# Patient Record
Sex: FEMALE | Race: WHITE | Employment: FULL TIME | URBAN - METROPOLITAN AREA
[De-identification: names, ages, dates, MRNs, and addresses within clinical notes are randomized per-mention and may not be internally consistent; named-entity substitution may affect disease eponyms.]

---

## 2018-10-16 ENCOUNTER — TRANSFERRED RECORDS (OUTPATIENT)
Dept: HEALTH INFORMATION MANAGEMENT | Facility: CLINIC | Age: 38
End: 2018-10-16

## 2018-12-05 NOTE — TELEPHONE ENCOUNTER
RECORDS STATUS - ALL OTHER DIAGNOSIS      RECORDS RECEIVED FROM: [facility] derm clinic   DATE RECEIVED: 12/05/2018   NOTES STATUS DETAILS   OFFICE NOTE from referring provider Requested**UPDATE fax received and sent to HIM for scanning Dr Easley [facility] in [place], [place]   OFFICE NOTE from medical oncologist     DISCHARGE SUMMARY from hospital     DISCHARGE REPORT from the ER     OPERATIVE REPORT     MEDICATION LIST     CLINICAL TRIAL TREATMENTS TO DATE     LABS     PATHOLOGY REPORTS     ANYTHING RELATED TO DIAGNOSIS     GENONOMIC TESTING     TYPE:     IMAGING (NEED IMAGES & REPORT)     CT SCANS     MRI     MAMMO     ULTRASOUND     PET     Spoke to the [facility] clinic and they will call me back when they fax the MR over to us.

## 2018-12-06 ENCOUNTER — TELEPHONE (OUTPATIENT)
Dept: ONCOLOGY | Facility: CLINIC | Age: 38
End: 2018-12-06

## 2018-12-06 ENCOUNTER — PRE VISIT (OUTPATIENT)
Dept: ONCOLOGY | Facility: CLINIC | Age: 38
End: 2018-12-06

## 2018-12-06 ENCOUNTER — ONCOLOGY VISIT (OUTPATIENT)
Dept: ONCOLOGY | Facility: CLINIC | Age: 38
End: 2018-12-06
Attending: INTERNAL MEDICINE
Payer: COMMERCIAL

## 2018-12-06 VITALS
RESPIRATION RATE: 16 BRPM | WEIGHT: 219.9 LBS | TEMPERATURE: 98 F | DIASTOLIC BLOOD PRESSURE: 89 MMHG | HEART RATE: 79 BPM | SYSTOLIC BLOOD PRESSURE: 124 MMHG | OXYGEN SATURATION: 100 %

## 2018-12-06 DIAGNOSIS — C43.9 MELANOMA OF SKIN (H): Primary | ICD-10-CM

## 2018-12-06 PROBLEM — C43.71: Status: ACTIVE | Noted: 2018-12-06

## 2018-12-06 PROCEDURE — G0463 HOSPITAL OUTPT CLINIC VISIT: HCPCS | Mod: ZF

## 2018-12-06 PROCEDURE — 99205 OFFICE O/P NEW HI 60 MIN: CPT | Mod: ZP | Performed by: INTERNAL MEDICINE

## 2018-12-06 RX ORDER — DULOXETIN HYDROCHLORIDE 30 MG/1
CAPSULE, DELAYED RELEASE ORAL
COMMUNITY
Start: 2018-12-05

## 2018-12-06 RX ORDER — METOPROLOL SUCCINATE 100 MG/1
100 TABLET, EXTENDED RELEASE ORAL DAILY
Refills: 3 | COMMUNITY
Start: 2018-09-19

## 2018-12-06 RX ORDER — DEXLANSOPRAZOLE 60 MG/1
60 CAPSULE, DELAYED RELEASE ORAL
COMMUNITY

## 2018-12-06 RX ORDER — LEVOTHYROXINE SODIUM 25 UG/1
TABLET ORAL
Refills: 3 | COMMUNITY
Start: 2018-10-03

## 2018-12-06 ASSESSMENT — PAIN SCALES - GENERAL: PAINLEVEL: NO PAIN (0)

## 2018-12-06 NOTE — NURSING NOTE
Oncology Rooming Note    December 6, 2018 8:36 AM   Malika Alvarado is a 38 year old female who presents for:    Chief Complaint   Patient presents with     Oncology Clinic Visit     new pt complete questionable melanoma      Initial Vitals: There were no vitals taken for this visit. There is no height or weight on file to calculate BMI. There is no height or weight on file to calculate BSA.  Data Unavailable Comment: Data Unavailable   No LMP recorded.  Allergies reviewed: Yes  Medications reviewed: Yes    Medications: Medication refills not needed today.  Pharmacy name entered into EPIC: Data Unavailable    Clinical concerns: none      8 minutes for nursing intake (face to face time)     Yen Apolinar, CMA

## 2018-12-06 NOTE — MR AVS SNAPSHOT
After Visit Summary   12/6/2018    Malika Alvarado    MRN: [id number]           Patient Information     Date Of Birth          1980        Visit Information        Provider Department      12/6/2018 8:45 AM Fritz Odom MD [facility]        Today's Diagnoses     Melanoma of skin (H)    -  1       Follow-ups after your visit        Additional Services     GENERAL SURG ADULT REFERRAL       Your provider has referred you to: [facility]: Surgical Oncology - [place] [phone]    [email or web address]    Please be aware that coverage of these services is subject to the terms and limitations of your health insurance plan.  Call member services at your health plan with any benefit or coverage questions.      Please bring the following with you to your appointment:    (1) Any X-Rays, CTs or MRIs which have been performed.  Contact the facility where they were done to arrange for  prior to your scheduled appointment.   (2) List of current medications   (3) This referral request   (4) Any documents/labs given to you for this referral                  Follow-up notes from your care team     Return in about 4 weeks (around 1/3/2019).      Your next 10 appointments already scheduled     Dec 07, 2018 10:15 AM CST   (Arrive by 10:00 AM)   New Patient Visit with Cameron Chris MD   [facility] [place] ([facility])    [address] [place] [address]  [address] [place] [id number] [phone]              Who to contact     If you have questions or need follow up information about today's clinic visit or your schedule please contact [facility] directly at [phone].  Normal or non-critical lab and imaging results will be communicated to you by MyChart, letter or phone within 4 business days after the clinic has received the results. If you do not hear from us within 7 days, please  "contact the clinic through [employer] or phone. If you have a critical or abnormal lab result, we will notify you by phone as soon as possible.  Submit refill requests through [employer] or call your pharmacy and they will forward the refill request to us. Please allow 3 business days for your refill to be completed.          Additional Information About Your Visit        [employer] Information     [employer] lets you send messages to your doctor, view your test results, renew your prescriptions, schedule appointments and more. To sign up, go to [email or web address] . Click on \"Log in\" on the left side of the screen, which will take you to the Welcome page. Then click on \"Sign up Now\" on the right side of the page.     You will be asked to enter the access code listed below, as well as some personal information. Please follow the directions to create your username and password.     Your access code is: [email or web address]  Expires: 2019 12:58 PM     Your access code will  in 90 days. If you need help or a new code, please call your [place] clinic or [phone].        Care EveryWhere ID     This is your Care EveryWhere ID. This could be used by other organizations to access your [facility] [place] medical records  [id number]        Your Vitals Were     Pulse Temperature Respirations Pulse Oximetry          79 98  F (36.7  C) (Oral) 16 100%         Blood Pressure from Last 3 Encounters:   18 124/89    Weight from Last 3 Encounters:   18 99.7 kg (219 lb 14.4 oz)              We Performed the Following     GENERAL SURG ADULT REFERRAL        Primary Care Provider Office Phone # Fax #    Mckenzie Palacio PA-C [phone]       [address] [address]        Equal Access to Services     ALEJANDRA CALHOUN : Shadia Mckeon, matthew moses, tang nayak, toro zafar. So [facility] [phone].    ATENCIÓN: Si guanakola español, tiene a mercedes " disposición servicios gratuitos de asistencia lingüística. Cata troy [phone].    We comply with applicable federal civil rights laws and [place] laws. We do not discriminate on the basis of race, color, national origin, age, disability, sex, sexual orientation, or gender identity.            Thank you!     Thank you for choosing [facility] CANCER [facility]  for your care. Our goal is always to provide you with excellent care. Hearing back from our patients is one way we can continue to improve our services. Please take a few minutes to complete the written survey that you may receive in the mail after your visit with us. Thank you!             Your Updated Medication List - Protect others around you: Learn how to safely use, store and throw away your medicines at www.disposemymeds.org.          This list is accurate as of 12/6/18 10:12 AM.  Always use your most recent med list.                   Brand Name Dispense Instructions for use Diagnosis    DEXILANT 60 MG Cpdr CR capsule   Generic drug:  dexlansoprazole      Take 60 mg by mouth        DULoxetine 30 MG capsule    CYMBALTA     TAKE 1 CAPSULE BY MOUTH ONCE DAILY        IRON PO      Take 1 tablet by mouth        levothyroxine 25 MCG tablet    SYNTHROID/LEVOTHROID     TAKE 1 TABLET (25 MCG TOTAL) BY MOUTH ONCE DAILY.        metoprolol succinate  MG 24 hr tablet    TOPROL-XL     Take 100 mg by mouth daily

## 2018-12-06 NOTE — TELEPHONE ENCOUNTER
ONCOLOGY INTAKE: Records Information      APPT INFORMATION: 12/7/18  Referring provider:  Dr. Jose Martin Rubi  Referring provider s clinic:  [facility]  Reason for visit/diagnosis:  Surgical Consult for Melanoma of Skin    Were the records received with the referral (via Rightfax)? In Epic    Has patient been seen for any external appt for this diagnosis (enter clinic/location)? Yes - [facility] - We should have all records from pt's appt with Dr. Rubi.

## 2018-12-06 NOTE — LETTER
"12/6/2018       RE: Malika Alvarado  [address]  [place] [address]     Dear Colleague,    Thank you for referring your patient, Malika Alvarado, to the [facility] CANCER [facility]INIC. Please see a copy of my visit note below.    [facility]  MEDICAL ONCOLOGY CONSULT  Dec 6, 2018    CHIEF COMPLAINT: Cutaneous melanoma    HISTORY OF PRESENT ILLNESS  Malika Alvarado is a 38 year old female with a new diagnosis of invasive cutaneous melanoma. She first noted a mole in the right inner portion of her thigh about 1 1/2 years ago. She brought it up to her primary care doctor because it started to look \"odd\". The lesion was excised at [facility] on 10/16/2018 and punch biopsy specimen ([id number]) was described as a cylindrical portion of skin 0.6 cm in diameter and excised to a depth of 0.3 cm. The pathology report does not include information on depth of invasion, ulceration, or other standard pathologic descriptors. However, sample is described as a \"deep-penetrating melanocytic tumor, extending to within 0.5 mm of the peripheral biopsy margin\".     The specimen was sent to [facility] for further pathologic review and FISH testing. Several minor abnormalities were noted, including possible increased copy number of RREB1, CCND1, and MYC and loss of CDKN2A, however, magnitude of the changes seen was small and felt to be equivocal for a melanoma diagnosis.    Patient today feels well and she denies any major complaints, other than anxiety regarding the diagnosis and the delay in receiving a concrete diagnosis to decide on next steps.    Patient is a current smoker. She has had several indoor tanning sessions, which she would estimate at >100. She tends to sunburn, and remembers at least 2 severe and painful sunburns in her youth. She does use sunscreen with prolonged sun exposures, but not typically for short intermittent exposures. She denies a family history of melanoma, but a paternal uncle did " have pancreatic cancer, and her paternal grandmother had some kind of skin cancer.      REVIEW OF SYSTEMS  A 12-point ROS negative except as in HPI    Current Outpatient Medications   Medication Sig Dispense Refill     dexlansoprazole (DEXILANT) 60 MG CPDR CR capsule Take 60 mg by mouth       DULoxetine (CYMBALTA) 30 MG capsule TAKE 1 CAPSULE BY MOUTH ONCE DAILY       IRON PO Take 1 tablet by mouth       levothyroxine (SYNTHROID/LEVOTHROID) 25 MCG tablet TAKE 1 TABLET (25 MCG TOTAL) BY MOUTH ONCE DAILY.  3     metoprolol succinate ER (TOPROL-XL) 100 MG 24 hr tablet Take 100 mg by mouth daily  3       No Known Allergies    PAST MEDICAL HISTORY  1. Hypothyroidism  2. Hypertension  3. Mild depression    PAST SURGICAL HISTORY  1. Tonsillectomy  2.     SOCIAL HISTORY  Patient is  and has one 6 year old son. She smoked 1/4 to 1/2 pack per day. She is a social drinker.  History   Smoking Status     Current Every Day Smoker   Smokeless Tobacco     Never Used    Social History    Substance and Sexual Activity      Alcohol use: Not on file     History   Drug Use Not on file       FAMILY HISTORY  Mother: Essential thrombocythemia  Father: No cancer  Paternal grandmother: skin cancer  Paternal grandfather: lung cancer  Paternal uncle: pancreatic cancer    PHYSICAL EXAMINATION  /89 (BP Location: Right arm, Patient Position: Sitting, Cuff Size: Adult Regular)   Pulse 79   Temp 98  F (36.7  C) (Oral)   Resp 16   Wt 99.7 kg (219 lb 14.4 oz)   SpO2 100%   Wt Readings from Last 2 Encounters:   18 100.6 kg (221 lb 12.8 oz)   18 99.7 kg (219 lb 14.4 oz)     Physical Exam   Constitutional: She appears well-developed and well-nourished.   HENT:   Head: Normocephalic.   Mouth/Throat: Oropharynx is clear and moist.   Eyes: Pupils are equal, round, and reactive to light. No scleral icterus.   Neck: Normal range of motion.   Cardiovascular: Regular rhythm.   Pulmonary/Chest: Effort normal.   Abdominal:  Soft.   Musculoskeletal: Normal range of motion.   Lymphadenopathy:     She has no cervical adenopathy.     She has no axillary adenopathy.        Right: No inguinal adenopathy present.        Left: No inguinal adenopathy present.   Neurological: She is alert.   Skin: Skin is warm and dry.   No residual lesion identified right inner thigh   Nursing note and vitals reviewed.      ASSESSMENT AND PLAN  #1 Cutaneous melanoma versus dysplastic nevus, excised to depth 3-mm  It was a pleasure to meet Mrs. Alvarado today. She is a very pleasant 38 year old woman with a recently excised lesion that has been called equivocal for melanoma. We will request the pathologic specimen be reviewed by our pathologists.    Patient has been distressed by the long wait to get to a definitive diagnosis for next steps in management. We reviewed that based on the description of the biopsy specimen excised to depth of 3 mm with tumor cells seen within 0.5 mm of peripheral margin, the melanoma is likely to be less than T3 lesion. I explained that we typically would offer a sentinel lymph node biopsy and wide local excision for any lesion > 0.8 mm, therefore, given the uncertainties with the pathologic diagnosis it would be reasonable to refer her to surgical oncology.    I will plan to see her back in about 1 month for further review of pathology and results of her sentinel lymph node biopsy.    Fritz Sidhu M.D.   of Medicine  Hematology, Oncology and Transplantation      Melanoma of skin (H)  - GENERAL SURG ADULT REFERRAL          Again, thank you for allowing me to participate in the care of your patient.      Sincerely,    Fritz Rubi MD

## 2018-12-07 ENCOUNTER — ONCOLOGY VISIT (OUTPATIENT)
Dept: ONCOLOGY | Facility: CLINIC | Age: 38
End: 2018-12-07
Attending: SURGERY
Payer: COMMERCIAL

## 2018-12-07 ENCOUNTER — PRE VISIT (OUTPATIENT)
Dept: ONCOLOGY | Facility: CLINIC | Age: 38
End: 2018-12-07

## 2018-12-07 VITALS
RESPIRATION RATE: 14 BRPM | HEART RATE: 80 BPM | HEIGHT: 66 IN | BODY MASS INDEX: 35.65 KG/M2 | OXYGEN SATURATION: 100 % | DIASTOLIC BLOOD PRESSURE: 99 MMHG | TEMPERATURE: 97 F | WEIGHT: 221.8 LBS | SYSTOLIC BLOOD PRESSURE: 142 MMHG

## 2018-12-07 DIAGNOSIS — C43.71 MELANOMA OF THIGH, RIGHT (H): ICD-10-CM

## 2018-12-07 PROCEDURE — G0463 HOSPITAL OUTPT CLINIC VISIT: HCPCS | Mod: ZF

## 2018-12-07 RX ORDER — CEFAZOLIN SODIUM 1 G/50ML
1 INJECTION, SOLUTION INTRAVENOUS SEE ADMIN INSTRUCTIONS
Status: CANCELLED | OUTPATIENT
Start: 2018-12-07

## 2018-12-07 RX ORDER — CEFAZOLIN SODIUM 2 G/50ML
2 SOLUTION INTRAVENOUS
Status: CANCELLED | OUTPATIENT
Start: 2018-12-07

## 2018-12-07 ASSESSMENT — PAIN SCALES - GENERAL: PAINLEVEL: NO PAIN (0)

## 2018-12-07 NOTE — NURSING NOTE
"Oncology Rooming Note    December 7, 2018 10:02 AM   Malika Alvarado is a 38 year old female who presents for:    Chief Complaint   Patient presents with     Oncology Clinic Visit     [facility] NEW- MELANOMA     Initial Vitals: BP (!) 142/99 (BP Location: Right arm, Patient Position: Chair, Cuff Size: Adult Large)  Pulse 80  Temp 97  F (36.1  C) (Oral)  Resp 14  Ht 1.68 m (5' 6.14\")  Wt 100.6 kg (221 lb 12.8 oz)  SpO2 100%  BMI 35.65 kg/m2 Estimated body mass index is 35.65 kg/(m^2) as calculated from the following:    Height as of this encounter: 1.68 m (5' 6.14\").    Weight as of this encounter: 100.6 kg (221 lb 12.8 oz). Body surface area is 2.17 meters squared.  No Pain (0) Comment: Data Unavailable   No LMP recorded.  Allergies reviewed: Yes  Medications reviewed: Yes    Medications: Medication refills not needed today.  Pharmacy name entered into EPIC: Data Unavailable    Clinical concerns: New patient. Dot was notified.    10 minutes for nursing intake (face to face time)     Apolinar Mcgowan LPN            "

## 2018-12-07 NOTE — PROGRESS NOTES
HISTORY OF PRESENT ILLNESS:  Malika Alvarado is a 38-year-old woman I was asked to see at the request of Dr. Jose Martin Rubi for evaluation of a potential melanoma of the right inner thigh.  She noticed a lesion in her right inner thigh about a year or year and a half ago.  She saw her primary care physician who performed a 5 mm punch biopsy.  This was on 10/16.  The tissue has been reviewed at [facility] in [place] and at [facility].  The [facility] pathology report is equivocal.  There is a concern for melanoma but there is no information on the potential thickness or other prognostic factors for melanoma.  She has been really in limbo for multiple weeks about how this should be managed.  She saw Dr. Rubi yesterday who has requested her slides from [facility] to be reviewed here by our dermatopathologist.  She is now here to talk about treatment options.  She denies any symptoms of lymphadenopathy.  She denies any symptoms of metastatic disease.  She is otherwise healthy with no major medical problems except for mild hypertension.      FAMILY HISTORY:  Negative for melanoma.      PHYSICAL EXAMINATION:  She has a scar in her right inner thigh.  I do not see any residual pigmentation.  She has no lymphadenopathy.      ASSESSMENT AND PLAN:  Atypical skin lesion, right thigh.        PLAN:  She is obviously frustrated that she has no diagnosis and no plan of course of treatment for this lesion, so I have recommended that we assume that this is a melanoma and administer appropriate melanoma treatment which would be a wide local excision with a sentinel lymph node biopsy.  We will schedule this out for a couple of weeks to give us time for our pathologists to review this lesion.  If our pathologists think that this is not a melanoma or if our treatment is clearly overtreatment, then we can cut back on her surgery or potentially cancel it.  If our pathologists are equivocal as were the  [facility] pathologists, then I think we would proceed with a wide local excision and sentinel lymph node biopsy.  The patient and her  are in agreement with this plan.  We will tentatively schedule this for a couple of weeks.      TT:  30 minutes.  TT:  20 minutes.      cc:   Fritz Rubi MD   [facility]   [address] [place] [address]  [id number]

## 2018-12-11 NOTE — TELEPHONE ENCOUNTER
Found slides at [facility] and they will Fed Ex slides.  Path report to be faxed.

## 2018-12-12 NOTE — PROGRESS NOTES
"[facility]  MEDICAL ONCOLOGY CONSULT  Dec 6, 2018    CHIEF COMPLAINT: Cutaneous melanoma    HISTORY OF PRESENT ILLNESS  Malika Alvarado is a 38 year old female with a new diagnosis of invasive cutaneous melanoma. She first noted a mole in the right inner portion of her thigh about 1 1/2 years ago. She brought it up to her primary care doctor because it started to look \"odd\". The lesion was excised at [facility] on 10/16/2018 and punch biopsy specimen ([id number]) was described as a cylindrical portion of skin 0.6 cm in diameter and excised to a depth of 0.3 cm. The pathology report does not include information on depth of invasion, ulceration, or other standard pathologic descriptors. However, sample is described as a \"deep-penetrating melanocytic tumor, extending to within 0.5 mm of the peripheral biopsy margin\".     The specimen was sent to [facility] for further pathologic review and FISH testing. Several minor abnormalities were noted, including possible increased copy number of RREB1, CCND1, and MYC and loss of CDKN2A, however, magnitude of the changes seen was small and felt to be equivocal for a melanoma diagnosis.    Patient today feels well and she denies any major complaints, other than anxiety regarding the diagnosis and the delay in receiving a concrete diagnosis to decide on next steps.    Patient is a current smoker. She has had several indoor tanning sessions, which she would estimate at >100. She tends to sunburn, and remembers at least 2 severe and painful sunburns in her youth. She does use sunscreen with prolonged sun exposures, but not typically for short intermittent exposures. She denies a family history of melanoma, but a paternal uncle did have pancreatic cancer, and her paternal grandmother had some kind of skin cancer.      REVIEW OF SYSTEMS  A 12-point ROS negative except as in HPI    Current Outpatient Medications   Medication Sig Dispense Refill     " dexlansoprazole (DEXILANT) 60 MG CPDR CR capsule Take 60 mg by mouth       DULoxetine (CYMBALTA) 30 MG capsule TAKE 1 CAPSULE BY MOUTH ONCE DAILY       IRON PO Take 1 tablet by mouth       levothyroxine (SYNTHROID/LEVOTHROID) 25 MCG tablet TAKE 1 TABLET (25 MCG TOTAL) BY MOUTH ONCE DAILY.  3     metoprolol succinate ER (TOPROL-XL) 100 MG 24 hr tablet Take 100 mg by mouth daily  3       No Known Allergies    PAST MEDICAL HISTORY  1. Hypothyroidism  2. Hypertension  3. Mild depression    PAST SURGICAL HISTORY  1. Tonsillectomy  2.     SOCIAL HISTORY  Patient is  and has one 6 year old son. She smoked 1/4 to 1/2 pack per day. She is a social drinker.  History   Smoking Status     Current Every Day Smoker   Smokeless Tobacco     Never Used    Social History    Substance and Sexual Activity      Alcohol use: Not on file     History   Drug Use Not on file       FAMILY HISTORY  Mother: Essential thrombocythemia  Father: No cancer  Paternal grandmother: skin cancer  Paternal grandfather: lung cancer  Paternal uncle: pancreatic cancer    PHYSICAL EXAMINATION  /89 (BP Location: Right arm, Patient Position: Sitting, Cuff Size: Adult Regular)   Pulse 79   Temp 98  F (36.7  C) (Oral)   Resp 16   Wt 99.7 kg (219 lb 14.4 oz)   SpO2 100%   Wt Readings from Last 2 Encounters:   18 100.6 kg (221 lb 12.8 oz)   18 99.7 kg (219 lb 14.4 oz)     Physical Exam   Constitutional: She appears well-developed and well-nourished.   HENT:   Head: Normocephalic.   Mouth/Throat: Oropharynx is clear and moist.   Eyes: Pupils are equal, round, and reactive to light. No scleral icterus.   Neck: Normal range of motion.   Cardiovascular: Regular rhythm.   Pulmonary/Chest: Effort normal.   Abdominal: Soft.   Musculoskeletal: Normal range of motion.   Lymphadenopathy:     She has no cervical adenopathy.     She has no axillary adenopathy.        Right: No inguinal adenopathy present.        Left: No inguinal  adenopathy present.   Neurological: She is alert.   Skin: Skin is warm and dry.   No residual lesion identified right inner thigh   Nursing note and vitals reviewed.      ASSESSMENT AND PLAN  #1 Cutaneous melanoma versus dysplastic nevus, excised to depth 3-mm  It was a pleasure to meet Mrs. Alvarado today. She is a very pleasant 38 year old woman with a recently excised lesion that has been called equivocal for melanoma. We will request the pathologic specimen be reviewed by our pathologists.    Patient has been distressed by the long wait to get to a definitive diagnosis for next steps in management. We reviewed that based on the description of the biopsy specimen excised to depth of 3 mm with tumor cells seen within 0.5 mm of peripheral margin, the melanoma is likely to be less than T3 lesion. I explained that we typically would offer a sentinel lymph node biopsy and wide local excision for any lesion > 0.8 mm, therefore, given the uncertainties with the pathologic diagnosis it would be reasonable to refer her to surgical oncology.    I will plan to see her back in about 1 month for further review of pathology and results of her sentinel lymph node biopsy.    Fritz Sidhu M.D.   of Medicine  Hematology, Oncology and Transplantation      Melanoma of skin (H)  - GENERAL SURG ADULT REFERRAL

## 2018-12-13 PROCEDURE — 00000346 ZZHCL STATISTIC REVIEW OUTSIDE SLIDES TC 88321: Performed by: SURGERY

## 2018-12-24 ENCOUNTER — ANESTHESIA EVENT (OUTPATIENT)
Dept: SURGERY | Facility: AMBULATORY SURGERY CENTER | Age: 38
End: 2018-12-24

## 2018-12-24 DIAGNOSIS — C43.71 MELANOMA OF THIGH, RIGHT (H): Primary | ICD-10-CM

## 2018-12-24 RX ORDER — CEFAZOLIN SODIUM 2 G/50ML
2 SOLUTION INTRAVENOUS
Status: CANCELLED | OUTPATIENT
Start: 2018-12-24

## 2018-12-24 RX ORDER — CEFAZOLIN SODIUM 1 G/50ML
1 INJECTION, SOLUTION INTRAVENOUS SEE ADMIN INSTRUCTIONS
Status: CANCELLED | OUTPATIENT
Start: 2018-12-24

## 2018-12-26 ENCOUNTER — HOSPITAL ENCOUNTER (OUTPATIENT)
Facility: AMBULATORY SURGERY CENTER | Age: 38
End: 2018-12-26
Attending: SURGERY
Payer: COMMERCIAL

## 2018-12-26 ENCOUNTER — ANESTHESIA (OUTPATIENT)
Dept: SURGERY | Facility: AMBULATORY SURGERY CENTER | Age: 38
End: 2018-12-26

## 2018-12-26 VITALS
OXYGEN SATURATION: 100 % | BODY MASS INDEX: 35.52 KG/M2 | RESPIRATION RATE: 16 BRPM | SYSTOLIC BLOOD PRESSURE: 134 MMHG | DIASTOLIC BLOOD PRESSURE: 93 MMHG | TEMPERATURE: 98.2 F | WEIGHT: 221 LBS | HEIGHT: 66 IN

## 2018-12-26 LAB
HCG UR QL: NEGATIVE
INTERNAL QC OK POCT: YES

## 2018-12-26 RX ORDER — FENTANYL CITRATE 50 UG/ML
25-50 INJECTION, SOLUTION INTRAMUSCULAR; INTRAVENOUS
Status: DISCONTINUED | OUTPATIENT
Start: 2018-12-26 | End: 2018-12-27 | Stop reason: HOSPADM

## 2018-12-26 RX ORDER — CEFAZOLIN SODIUM 2 G/50ML
2 SOLUTION INTRAVENOUS
Status: DISCONTINUED | OUTPATIENT
Start: 2018-12-26 | End: 2018-12-27 | Stop reason: HOSPADM

## 2018-12-26 RX ORDER — NALOXONE HYDROCHLORIDE 0.4 MG/ML
.1-.4 INJECTION, SOLUTION INTRAMUSCULAR; INTRAVENOUS; SUBCUTANEOUS
Status: DISCONTINUED | OUTPATIENT
Start: 2018-12-26 | End: 2018-12-27 | Stop reason: HOSPADM

## 2018-12-26 RX ORDER — ONDANSETRON 4 MG/1
4 TABLET, ORALLY DISINTEGRATING ORAL EVERY 30 MIN PRN
Status: DISCONTINUED | OUTPATIENT
Start: 2018-12-26 | End: 2018-12-27 | Stop reason: HOSPADM

## 2018-12-26 RX ORDER — OXYCODONE HYDROCHLORIDE 5 MG/1
5 TABLET ORAL EVERY 4 HOURS PRN
Status: DISCONTINUED | OUTPATIENT
Start: 2018-12-26 | End: 2018-12-27 | Stop reason: HOSPADM

## 2018-12-26 RX ORDER — SODIUM CHLORIDE, SODIUM LACTATE, POTASSIUM CHLORIDE, CALCIUM CHLORIDE 600; 310; 30; 20 MG/100ML; MG/100ML; MG/100ML; MG/100ML
INJECTION, SOLUTION INTRAVENOUS CONTINUOUS
Status: DISCONTINUED | OUTPATIENT
Start: 2018-12-26 | End: 2018-12-27 | Stop reason: HOSPADM

## 2018-12-26 RX ORDER — PROPOFOL 10 MG/ML
INJECTION, EMULSION INTRAVENOUS CONTINUOUS PRN
Status: DISCONTINUED | OUTPATIENT
Start: 2018-12-26 | End: 2018-12-26

## 2018-12-26 RX ORDER — ACETAMINOPHEN 325 MG/1
975 TABLET ORAL ONCE
Status: COMPLETED | OUTPATIENT
Start: 2018-12-26 | End: 2018-12-26

## 2018-12-26 RX ORDER — MEPERIDINE HYDROCHLORIDE 25 MG/ML
12.5 INJECTION INTRAMUSCULAR; INTRAVENOUS; SUBCUTANEOUS
Status: DISCONTINUED | OUTPATIENT
Start: 2018-12-26 | End: 2018-12-27 | Stop reason: HOSPADM

## 2018-12-26 RX ORDER — FENTANYL CITRATE 50 UG/ML
INJECTION, SOLUTION INTRAMUSCULAR; INTRAVENOUS PRN
Status: DISCONTINUED | OUTPATIENT
Start: 2018-12-26 | End: 2018-12-26

## 2018-12-26 RX ORDER — KETOROLAC TROMETHAMINE 30 MG/ML
30 INJECTION, SOLUTION INTRAMUSCULAR; INTRAVENOUS EVERY 6 HOURS PRN
Status: DISCONTINUED | OUTPATIENT
Start: 2018-12-26 | End: 2018-12-27 | Stop reason: HOSPADM

## 2018-12-26 RX ORDER — CEFAZOLIN SODIUM 1 G/50ML
1 SOLUTION INTRAVENOUS SEE ADMIN INSTRUCTIONS
Status: DISCONTINUED | OUTPATIENT
Start: 2018-12-26 | End: 2018-12-27 | Stop reason: HOSPADM

## 2018-12-26 RX ORDER — DEXAMETHASONE SODIUM PHOSPHATE 4 MG/ML
INJECTION, SOLUTION INTRA-ARTICULAR; INTRALESIONAL; INTRAMUSCULAR; INTRAVENOUS; SOFT TISSUE PRN
Status: DISCONTINUED | OUTPATIENT
Start: 2018-12-26 | End: 2018-12-26

## 2018-12-26 RX ORDER — ONDANSETRON 2 MG/ML
4 INJECTION INTRAMUSCULAR; INTRAVENOUS EVERY 30 MIN PRN
Status: DISCONTINUED | OUTPATIENT
Start: 2018-12-26 | End: 2018-12-27 | Stop reason: HOSPADM

## 2018-12-26 RX ORDER — ONDANSETRON 2 MG/ML
INJECTION INTRAMUSCULAR; INTRAVENOUS PRN
Status: DISCONTINUED | OUTPATIENT
Start: 2018-12-26 | End: 2018-12-26

## 2018-12-26 RX ORDER — LIDOCAINE 40 MG/G
CREAM TOPICAL
Status: DISCONTINUED | OUTPATIENT
Start: 2018-12-26 | End: 2018-12-27 | Stop reason: HOSPADM

## 2018-12-26 RX ORDER — GABAPENTIN 300 MG/1
300 CAPSULE ORAL ONCE
Status: COMPLETED | OUTPATIENT
Start: 2018-12-26 | End: 2018-12-26

## 2018-12-26 RX ADMIN — FENTANYL CITRATE 50 MCG: 50 INJECTION, SOLUTION INTRAMUSCULAR; INTRAVENOUS at 10:41

## 2018-12-26 RX ADMIN — FENTANYL CITRATE 50 MCG: 50 INJECTION, SOLUTION INTRAMUSCULAR; INTRAVENOUS at 10:37

## 2018-12-26 RX ADMIN — GABAPENTIN 300 MG: 300 CAPSULE ORAL at 08:51

## 2018-12-26 RX ADMIN — ONDANSETRON 4 MG: 2 INJECTION INTRAMUSCULAR; INTRAVENOUS at 10:35

## 2018-12-26 RX ADMIN — PROPOFOL 150 MCG/KG/MIN: 10 INJECTION, EMULSION INTRAVENOUS at 10:39

## 2018-12-26 RX ADMIN — DEXAMETHASONE SODIUM PHOSPHATE 4 MG: 4 INJECTION, SOLUTION INTRA-ARTICULAR; INTRALESIONAL; INTRAMUSCULAR; INTRAVENOUS; SOFT TISSUE at 10:45

## 2018-12-26 RX ADMIN — ACETAMINOPHEN 975 MG: 325 TABLET ORAL at 08:51

## 2018-12-26 RX ADMIN — SODIUM CHLORIDE, SODIUM LACTATE, POTASSIUM CHLORIDE, CALCIUM CHLORIDE: 600; 310; 30; 20 INJECTION, SOLUTION INTRAVENOUS at 08:51

## 2018-12-26 ASSESSMENT — MIFFLIN-ST. JEOR: SCORE: 1708.17

## 2018-12-26 NOTE — ANESTHESIA POSTPROCEDURE EVALUATION
Anesthesia POST Procedure Evaluation    Patient: Malika Alvarado   MRN:     [id number] Gender:   female   Age:    38 year old :      1980        Preoperative Diagnosis: Right Thigh Melanoma   Procedure(s):  Wide Local Excision of Right Thigh Melanoma   Postop Comments: No value filed.       Anesthesia Type:  MAC    Reportable Event: NO     PAIN: Uncomplicated   Sign Out status: Comfortable, Well controlled pain     PONV: No PONV   Sign Out status:  No Nausea or Vomiting     Neuro/Psych: Uneventful perioperative course   Sign Out Status: Preoperative baseline; Age appropriate mentation     Airway/Resp.: Uneventful perioperative course   Sign Out Status: Non labored breathing, age appropriate RR; Resp. Status within EXPECTED Parameters     CV: Uneventful perioperative course   Sign Out status: Appropriate BP and perfusion indices; Appropriate HR/Rhythm     Disposition:   Sign Out in:  PACU  Disposition:  Phase II; Home  Recovery Course: Uneventful  Follow-Up: Not required           Last Anesthesia Record Vitals:  CRNA VITALS  2018 1046 - 2018 1146      2018             Resp Rate (set):  10          Last PACU/Preop Vitals:  Vitals:    18 0823 18 1120 18 1133   BP: (!) 140/108 137/87 (!) 134/93   Resp: 18 16 16   Temp: 37  C (98.6  F) 36.8  C (98.2  F)    SpO2: 100% 100% 100%         Electronically Signed By: Zechariah Ochoa MD, MD, 2018, 2:29 PM

## 2018-12-26 NOTE — OP NOTE
Procedure Date: 2018      DATE OF OPERATION:  2018      PREOPERATIVE DIAGNOSIS:  Possible melanoma, right inner thigh.      POSTOPERATIVE DIAGNOSIS:  Possible melanoma, right inner thigh.      PROCEDURE:  Wide local excision of right thigh melanoma.      ATTENDING SURGEON:  Cameron Chris MD      ANESTHESIA:  Local with IV sedation.      INDICATIONS FOR SURGERY:  The patient is a 38-year-old woman who had a cutaneous lesion on her right inner thigh.  A biopsy was performed elsewhere.  It was reviewed at the [facility] and they thought this was consistent with a melanoma, but the diagnosis was unclear.  our pathology reviewed the lesion and thought it was not a melanoma, but have some additional tests pending.  After having a discussion with Marlen about whether to wait or just to reexcise the lesion, she elected to have the lesion excised.      PROCEDURE IN DETAIL:  After informed consent, the patient was brought to the operating room, given IV sedation and prepped and draped in the usual fashion.  I administered local anesthetic to the right inner thigh.  I drew out a 1 cm margin of resection around her previous excision site.  The incision was oriented obliquely.  Bovie cautery was used to incise the subcutaneous tissues.  The dissection went down to the underlying fascia, which was left intact.  The specimen was removed, oriented and sent to pathology.  Strict hemostasis was obtained with the Bovie cautery.  The dermis was closed with interrupted 3-0 Vicryl suture.  The skin was closed with a running 4-0 PDS subcuticular stitch.  Dermabond was placed.  The patient was taken to the recovery room in stable condition.         CAMERON CHRIS MD             D: 2018   T: 2018   MT: al      Name:     MARLEN BARFIELD   MRN:      [id number]        Account:        [id number]   :      1980           Procedure Date: 2018      Document: [id number]

## 2018-12-26 NOTE — ANESTHESIA CARE TRANSFER NOTE
Patient: Malika Alvarado    Procedure(s):  Wide Local Excision of Right Thigh Melanoma    Diagnosis: Right Thigh Melanoma  Diagnosis Additional Information: No value filed.    Anesthesia Type:   No value filed.     Note:  Airway :Room Air  Patient transferred to:Phase II  Comments: Uneventful transport to Phase 2; IV patent; Pt responds appropriately to command; Pt comfortable; VSS: Report to RN /87 hr 73 RR 16 SpO2 100% TAT 98.2Handoff Report: Identifed the Patient, Identified the Reponsible Provider, Reviewed the pertinent medical history, Discussed the surgical course, Reviewed Intra-OP anesthesia mangement and issues during anesthesia, Set expectations for post-procedure period and Allowed opportunity for questions and acknowledgement of understanding      Vitals: (Last set prior to Anesthesia Care Transfer)    CRNA VITALS  12/26/2018 1046 - 12/26/2018 1121      12/26/2018             Resp Rate (set):  10                Electronically Signed By: RAMONE BEDOYA CRNA  December 26, 2018  11:21 AM

## 2018-12-26 NOTE — ANESTHESIA PREPROCEDURE EVALUATION
"Anesthesia Pre-Procedure Evaluation    Patient: Malika Alvarado   MRN:     [id number] Gender:   female   Age:    38 year old :      1980        Preoperative Diagnosis: Right Thigh Melanoma   Procedure(s):  Wide Local Excision of Right Thigh Melanoma     Past Medical History:   Diagnosis Date     Gastroesophageal reflux disease      Hypertension      Thyroid disease       Past Surgical History:   Procedure Laterality Date      SECTION                 [facility] AN PHYSICAL EXAM    No results found for: WBC, HGB, HCT, PLT, CRP, SED, NA, POTASSIUM, CHLORIDE, CO2, BUN, CR, GLC, ANN-MARIE, PHOS, MAG, ALBUMIN, PROTTOTAL, ALT, AST, GGT, ALKPHOS, BILITOTAL, BILIDIRECT, LIPASE, AMYLASE, RUFINO, PTT, INR, FIBR, TSH, T4, T3, HCG, HCGS, CKTOTAL, CKMB, TROPN    Preop Vitals  BP Readings from Last 3 Encounters:   18 (!) 140/108   18 (!) 142/99   18 124/89    Pulse Readings from Last 3 Encounters:   18 80   18 79      Resp Readings from Last 3 Encounters:   18 18   18 14   18 16    SpO2 Readings from Last 3 Encounters:   18 100%   18 100%   18 100%      Temp Readings from Last 1 Encounters:   18 37  C (98.6  F) (Oral)    Ht Readings from Last 1 Encounters:   18 1.683 m (5' 6.25\")      Wt Readings from Last 1 Encounters:   18 100.2 kg (221 lb)    Estimated body mass index is 35.4 kg/m  as calculated from the following:    Height as of this encounter: 1.683 m (5' 6.25\").    Weight as of this encounter: 100.2 kg (221 lb).     LDA:            Assessment:   ASA SCORE: 2    NPO Status: > 2 hours since completed Clear Liquids; > 6 hours since completed Solid Foods   Documentation: H&P complete; Preop Testing complete; Consents complete   Proceeding: Proceed without further delay  Tobacco Use:  NO Active use of Tobacco/UNKNOWN Tobacco use status     Plan:   Anes. Type:  MAC   Pre-Induction: Midazolam IV   Induction:  IV (Standard)   Airway: Native " Airway   Access/Monitoring: PIV   Maintenance: Propofol; IV   Emergence: Procedure Site   Logistics: Same Day Surgery     Postop Pain/Sedation Strategy:  Standard-Options: Opioids PRN     PONV Management:  Adult Risk Factors: Female, Non-Smoker, Postop Opioids  Prevention: Propofol Infusion; Ondansetron     CONSENT: Direct conversation   Plan and risks discussed with: Patient                            Brody Cruz MD

## 2018-12-26 NOTE — DISCHARGE INSTRUCTIONS
Caring for Your Incision    You ll need to care for your incision after surgery and certain medical procedures. To close an incision, your doctor used sutures (stitches), steri-strips, staples, or dermabond. Follow the tips on this sheet to help heal and prevent infection of your incision.   Types of Incision Closure:    Surgical Sutures (stitches) are placed by sewing the edges of an incision together with surgical thread. Sutures are either absorbable or non-absorbable. Absorbable sutures break down in the body over time. Non-absorbable sutures need to be removed.     Steri-strips are made of adhesive (sticky) material to help hold the edges of an incision together. Steri-strips usually fall off by themselves in 7 to 10 days.     Surgical Staples are made or steel or titanium. They are often used to close shallow incisions. They are not used on certain body areas, such as the face and hands. This is because these areas have nerves that are close to the surface. Staples are usually removed within a week.     Dermabond (skin glue) is used to close a cut or small incision. The skin glue is less painful than stitches (sutures). In some cases, a lower layer of skin may be sutured before Dermabond is applied. The skin glue closes the cut/incision within a few minutes. It also provides a water-resistant covering. No bandage is required. Dermabond peels off on its own within 5 to 10 days.  Home Care for Your  Incision:    Keep the incision clean and dry. You should bathe only as directed by the doctor. It is okay to wash around the incision, but don t spray water directly on it.     Check the incision site daily for pain, redness, drainage, swelling, or separation of the incision edges.     If you have a dressing over the incision, change the dressing as directed by the doctor.    Make sure any clothing that touches the incision is loose fitting. This will prevent rubbing. If the incision is on the head, avoid wearing  caps or other head coverings. These may rub against the incision.    Avoid rough play, contact sports, or physical activities. This can put you at risk of opening an incision.     As your incision heals, the skin may appear pink or red. It may also feel slightly bumpy or raised. This is called a healing ridge. Over time, the color should fade and the raised skin will become less noticeable.   Call the doctor right away if you have any of the following:    Increased pain, redness, drainage, swelling or bleeding at the incision site    Numbness, coldness or tingling around the incision site  Fever of 101 F degrees or higher        [facility]  Home Care Following Anesthesia  For 24 hours after surgery:  1. Get plenty of rest.  A responsible adult must stay with you for at least 24 hours after you leave the surgery center.  2. Do not drive or use heavy equipment.  If you have weakness or tingling, don't drive or use heavy equipment until this feeling goes away.   3. Do not drink alcohol.   4. Avoid strenuous or risky activities.  Ask for help when climbing stairs.  5. You may feel lightheaded.  IF so, sit for a few minutes before standing.  Have someone help you get up.   6. If you have nausea (feel sick to your stomach): Drink only clear liquids such as apple juice, ginger ale, broth or 7-Up.  Rest may also help.  Be sure to drink enough fluids.  Move to a regular diet as you feel able.   7. You may have a slight fever.  Call the doctor if your fever is over 100 F (37.7 C) (taken under the tongue) or lasts longer than 24 hours.  8. You may have a dry mouth, a sore throat, muscle aches or trouble sleeping. These should go away after 24 hours.  9. Do not make important or legal decisions.   If you use hormonal birth control (such as the pill, patch, ring or implants):  You will need a second form of birth control for 7 days (condoms, a diaphragm or contraceptive foam).  While in the  "surgery center, you received a medicine called Sugammadex.  Hormonal birth control (such as the pill, patch, ring or implants) will not work as well for a week after taking this medicine.  Today you received a Marcaine or bupivacaine block to numb the nerves near your surgery site.  This is a block using local anesthetic or \"numbing\" medication injected around the nerves to anesthetize or \"numb\" the area supplied by those nerves.  This block is injected into the muscle layer near your surgical site.  The medication may numb the location where you had surgery for 6-18 hours, but may last up to 24 hours.  If your surgical site is an arm or leg you should be careful with your affected limb, since it is possible to injure your limb without being aware of it due to the numbing.  Until full feeling returns, you should guard against bumping or hitting your limb, and avoid extreme hot or cold temperatures on the skin.  As the block wears off, the feeling will return as a tingling or prickly sensation near your surgical site.  You will experience more discomfort from your incision as the feeling returns.  You may want to take a pain pill (a narcotic or Tylenol if this was prescribed by your surgeon) when you start to experience mild pain before the pain beccomes more severe.  If your pain medications do not control your pain you should notifiy your surgeon.    Tips for taking pain medications  To get the best pain relief possible, remember these points:    Take pain medications as directed, before pain becomes severe.    Pain medication can upset your stomach: taking it with food may help.    Constipation is a common side effect of pain medication. Drink plenty of  fluids.    Eat foods high in fiber. Take a stool softener if recommended by your doctor or pharmacist.    Do not drink alcohol, drive or operate machinery while taking pain medications.    Ask about other ways to control pain, such as with heat, ice or " relaxation.    Tylenol/Acetaminophen Consumption  To help encourage the safe use of acetaminophen, the makers of TYLENOL  have lowered the maximum daily dose for single-ingredient Extra Strength TYLENOL  (acetaminophen) products sold in the [place] from 8 pills per day (4,000 mg) to 6 pills per day (3,000 mg). The dosing interval has also changed from 2 pills every 4-6 hours to 2 pills every 6 hours.    If you feel your pain relief is insufficient, you may take Tylenol/Acetaminophen in addition to your narcotic pain medication.     Be careful not to exceed 3,000 mg of Tylenol/Acetaminophen in a 24 hour period from all sources.    If you are taking extra strength Tylenol/acetaminophen (500 mg), the maximum dose is 6 tablets in 24 hours.    If you are taking regular strength acetaminophen (325 mg), the maximum dose is 9 tablets in 24 hours.    Call a doctor for any of the followin. Signs of infection (fever, growing tenderness at the surgery site, a large amount of drainage or bleeding, severe pain, foul-smelling drainage, redness, swelling).  2. It has been over 8 to 10 hours since surgery and you are still not able to urinate (pass water).  3. Headache for over 24 hours.  4. Numbness, tingling or weakness the day after surgery (if you had spinal anesthesia).  Your doctor is:       Dr. Cameron Chris, [facility]: [phone]               Or dial [phone] and ask for the resident on call for:  [facility]  For emergency care, call the:  [facility]:  [phone] (TTY for hearing impaired: [phone])

## 2018-12-28 LAB — COPATH REPORT: NORMAL

## 2019-02-15 ENCOUNTER — HEALTH MAINTENANCE LETTER (OUTPATIENT)
Age: 39
End: 2019-02-15

## 2019-12-29 LAB — LAB SCANNED RESULT: NORMAL

## 2019-12-29 PROCEDURE — 84999 UNLISTED CHEMISTRY PROCEDURE: CPT | Performed by: DERMATOLOGY

## 2019-12-29 PROCEDURE — 88374 M/PHMTRC ALYS ISHQUANT/SEMIQ: CPT | Performed by: DERMATOLOGY

## 2020-03-11 ENCOUNTER — HEALTH MAINTENANCE LETTER (OUTPATIENT)
Age: 40
End: 2020-03-11

## 2021-01-03 ENCOUNTER — HEALTH MAINTENANCE LETTER (OUTPATIENT)
Age: 41
End: 2021-01-03

## 2021-04-25 ENCOUNTER — HEALTH MAINTENANCE LETTER (OUTPATIENT)
Age: 41
End: 2021-04-25

## 2021-10-10 ENCOUNTER — HEALTH MAINTENANCE LETTER (OUTPATIENT)
Age: 41
End: 2021-10-10

## 2022-05-21 ENCOUNTER — HEALTH MAINTENANCE LETTER (OUTPATIENT)
Age: 42
End: 2022-05-21

## 2022-09-18 ENCOUNTER — HEALTH MAINTENANCE LETTER (OUTPATIENT)
Age: 42
End: 2022-09-18

## 2023-06-04 ENCOUNTER — HEALTH MAINTENANCE LETTER (OUTPATIENT)
Age: 43
End: 2023-06-04

## 2024-02-25 ENCOUNTER — HEALTH MAINTENANCE LETTER (OUTPATIENT)
Age: 44
End: 2024-02-25

## (undated) DEVICE — SOL WATER IRRIG 500ML BOTTLE 2F7113

## (undated) DEVICE — DRSG PRIMAPORE 02X3" 7133

## (undated) DEVICE — ESU GROUND PAD ADULT W/CORD E7507

## (undated) DEVICE — SOL NACL 0.9% IRRIG 500ML BOTTLE 2F7123

## (undated) DEVICE — ESU ELEC BLADE 2.75" COATED/INSULATED E1455

## (undated) DEVICE — PREP CHLORAPREP 26ML TINTED ORANGE  260815

## (undated) DEVICE — SU ETHILON 3-0 PS-1 18" 1663H

## (undated) DEVICE — GLOVE PROTEXIS POWDER FREE SMT 8.0  2D72PT80X

## (undated) DEVICE — SU SILK 3-0 SH 30" K832H

## (undated) DEVICE — DRAPE EXTREMITY W/ARMBOARD 29405

## (undated) DEVICE — LINEN TOWEL PACK X5 5464

## (undated) DEVICE — PACK MINOR CUSTOM ASC

## (undated) RX ORDER — PROPOFOL 10 MG/ML
INJECTION, EMULSION INTRAVENOUS
Status: DISPENSED
Start: 2018-12-26

## (undated) RX ORDER — LIDOCAINE HYDROCHLORIDE 20 MG/ML
INJECTION, SOLUTION EPIDURAL; INFILTRATION; INTRACAUDAL; PERINEURAL
Status: DISPENSED
Start: 2018-12-26

## (undated) RX ORDER — ACETAMINOPHEN 325 MG/1
TABLET ORAL
Status: DISPENSED
Start: 2018-12-26

## (undated) RX ORDER — DEXAMETHASONE SODIUM PHOSPHATE 4 MG/ML
INJECTION, SOLUTION INTRA-ARTICULAR; INTRALESIONAL; INTRAMUSCULAR; INTRAVENOUS; SOFT TISSUE
Status: DISPENSED
Start: 2018-12-26

## (undated) RX ORDER — GABAPENTIN 300 MG/1
CAPSULE ORAL
Status: DISPENSED
Start: 2018-12-26

## (undated) RX ORDER — BUPIVACAINE HYDROCHLORIDE 2.5 MG/ML
INJECTION, SOLUTION INFILTRATION; PERINEURAL
Status: DISPENSED
Start: 2018-12-26

## (undated) RX ORDER — FENTANYL CITRATE 50 UG/ML
INJECTION, SOLUTION INTRAMUSCULAR; INTRAVENOUS
Status: DISPENSED
Start: 2018-12-26

## (undated) RX ORDER — LIDOCAINE HYDROCHLORIDE AND EPINEPHRINE 10; 10 MG/ML; UG/ML
INJECTION, SOLUTION INFILTRATION; PERINEURAL
Status: DISPENSED
Start: 2018-12-26

## (undated) RX ORDER — ONDANSETRON 2 MG/ML
INJECTION INTRAMUSCULAR; INTRAVENOUS
Status: DISPENSED
Start: 2018-12-26

## (undated) RX ORDER — ISOSULFAN BLUE 50 MG/5ML
INJECTION, SOLUTION SUBCUTANEOUS
Status: DISPENSED
Start: 2018-12-26

## (undated) RX ORDER — CEFAZOLIN SODIUM 2 G/50ML
SOLUTION INTRAVENOUS
Status: DISPENSED
Start: 2018-12-26